# Patient Record
Sex: MALE | Race: OTHER
[De-identification: names, ages, dates, MRNs, and addresses within clinical notes are randomized per-mention and may not be internally consistent; named-entity substitution may affect disease eponyms.]

---

## 2019-04-22 PROBLEM — Z00.00 ENCOUNTER FOR PREVENTIVE HEALTH EXAMINATION: Status: ACTIVE | Noted: 2019-04-22

## 2019-04-23 ENCOUNTER — APPOINTMENT (OUTPATIENT)
Dept: SURGERY | Facility: CLINIC | Age: 69
End: 2019-04-23
Payer: COMMERCIAL

## 2019-04-23 VITALS
DIASTOLIC BLOOD PRESSURE: 83 MMHG | BODY MASS INDEX: 27.47 KG/M2 | WEIGHT: 157 LBS | HEART RATE: 73 BPM | SYSTOLIC BLOOD PRESSURE: 128 MMHG | TEMPERATURE: 98.4 F | HEIGHT: 63.5 IN

## 2019-04-23 DIAGNOSIS — L02.91 CUTANEOUS ABSCESS, UNSPECIFIED: ICD-10-CM

## 2019-04-23 PROCEDURE — 10061 I&D ABSCESS COMP/MULTIPLE: CPT

## 2019-04-23 PROCEDURE — 99203 OFFICE O/P NEW LOW 30 MIN: CPT | Mod: 25

## 2019-04-24 ENCOUNTER — APPOINTMENT (OUTPATIENT)
Dept: SURGERY | Facility: CLINIC | Age: 69
End: 2019-04-24
Payer: COMMERCIAL

## 2019-04-24 PROCEDURE — 99024 POSTOP FOLLOW-UP VISIT: CPT

## 2019-04-24 NOTE — REASON FOR VISIT
[Post Op: _________] : a [unfilled] post op visit [FreeTextEntry1] : s/p I&D of abscess to R. submandibular region, face

## 2019-04-24 NOTE — HISTORY OF PRESENT ILLNESS
[de-identified] : 68 y.o M presents for a f/u visit today, he has an abscess with swelling that is draining on the R. submandibular aspect. \par He is currently taking PO Bactrim, BID. Patient states he went to his PMD this AM and was told to present to the office here. Patient\par is having difficulty with his dressing/wound care.

## 2019-04-24 NOTE — PLAN
[FreeTextEntry1] : patient is s/p I&D in the office, 04/23\par wound was re-packed with dressing placed. \par he was instructed to continue PO Bactrim and wound care was also discussed. \par patient was also informed that if he experiences fevers/chills or no improvement of infection,\par he should present to the ER. \par He has a F/U appointment scheduled for 05/9

## 2019-04-29 NOTE — HISTORY OF PRESENT ILLNESS
[de-identified] : 68 y.o M presents w the cc of having an abscess with swelling that is draining on the R. submandibular aspect. Patient states he has had it for about 1 week\par and squeezing it. Patient started PO antibiotics x 2-3 days.

## 2019-04-29 NOTE — PHYSICAL EXAM
[Normal Breath Sounds] : Normal breath sounds [Normal Rate and Rhythm] : normal rate and rhythm [No HSM] : no hepatosplenomegaly [Alert] : alert [Oriented to Person] : oriented to person [Oriented to Place] : oriented to place [Oriented to Time] : oriented to time [Calm] : calm [Abdominal Masses] : No abdominal masses [Abdomen Tenderness] : ~T ~M No abdominal tenderness [de-identified] : Ene; NAD.  [de-identified] : EOMI [de-identified] : supple; no JVD [de-identified] : no LE swelling [de-identified] : has a large abscess with inflammatory process to the R. submandibular aspect of face

## 2019-04-29 NOTE — CONSULT LETTER
[Dear  ___] : Dear  [unfilled], [Consult Letter:] : I had the pleasure of evaluating your patient, [unfilled]. [Sincerely,] : Sincerely, [Consult Closing:] : Thank you very much for allowing me to participate in the care of this patient.  If you have any questions, please do not hesitate to contact me. [FreeTextEntry3] : Blu Diaz MD\par

## 2019-04-29 NOTE — REVIEW OF SYSTEMS
[Arthralgias] : arthralgias [Fever] : no fever [Chills] : no chills [Nosebleeds] : no nosebleeds [Chest Pain] : no chest pain [Shortness Of Breath] : no shortness of breath [Cough] : no cough [Abdominal Pain] : no abdominal pain [Hesitancy] : no urinary hesitancy [Dizziness] : no dizziness [Anxiety] : no anxiety [Muscle Weakness] : no muscle weakness [de-identified] : has an abscess to R. side of submandibular aspect of face, patient states he has been squeezing the area and having some drainage, he started PO Bactrim 2 days ago

## 2019-04-29 NOTE — REASON FOR VISIT
[Consultation] : a consultation visit [FreeTextEntry1] : has an infection to the R. submandibular region

## 2019-05-03 LAB — BACTERIA WND CULT: ABNORMAL

## 2019-05-09 ENCOUNTER — APPOINTMENT (OUTPATIENT)
Dept: SURGERY | Facility: CLINIC | Age: 69
End: 2019-05-09
Payer: COMMERCIAL

## 2019-05-09 PROCEDURE — 99213 OFFICE O/P EST LOW 20 MIN: CPT

## 2019-05-09 NOTE — REASON FOR VISIT
[Follow-Up: _____] : a [unfilled] follow-up visit [FreeTextEntry1] : s/p I&D of abscess to R. submandibular region, face.

## 2019-05-09 NOTE — CONSULT LETTER
[Dear  ___] : Dear  [unfilled], [Consult Letter:] : I had the pleasure of evaluating your patient, [unfilled]. [Consult Closing:] : Thank you very much for allowing me to participate in the care of this patient.  If you have any questions, please do not hesitate to contact me. [Sincerely,] : Sincerely, [FreeTextEntry3] : Blu Diaz MD\par

## 2019-05-09 NOTE — HISTORY OF PRESENT ILLNESS
[de-identified] : 68 y.o M presents for a f/u visit today, he has an abscess with swelling that is draining on the R. submandibular aspect. \par Culture was positive for MRSA, 04/24/19. Patient today, presents with significant improvement in abscess with swelling and infection that is resolved. \par He had recently completed course of PO Bactrim. He denies any fevers/chills; denies pain. \par

## 2019-05-09 NOTE — REVIEW OF SYSTEMS
[Fever] : no fever [Chills] : no chills [Shortness Of Breath] : no shortness of breath [Abdominal Pain] : no abdominal pain [Anxiety] : no anxiety [Swollen Glands] : no swollen glands [de-identified] : s/p I&D R. side of face, 04/23; denies swelling, denies pain

## 2019-05-09 NOTE — PLAN
[FreeTextEntry1] : infection has resolved; patient is doing well, without complaints. \par F/U PRN or if there are any problems